# Patient Record
Sex: MALE | Race: BLACK OR AFRICAN AMERICAN | NOT HISPANIC OR LATINO | URBAN - METROPOLITAN AREA
[De-identification: names, ages, dates, MRNs, and addresses within clinical notes are randomized per-mention and may not be internally consistent; named-entity substitution may affect disease eponyms.]

---

## 2017-05-01 ENCOUNTER — EMERGENCY (EMERGENCY)
Facility: HOSPITAL | Age: 22
LOS: 1 days | Discharge: LEFT BEFORE DISCHARGE | End: 2017-05-01
Attending: EMERGENCY MEDICINE
Payer: COMMERCIAL

## 2017-05-01 VITALS
TEMPERATURE: 98 F | HEIGHT: 71 IN | RESPIRATION RATE: 20 BRPM | HEART RATE: 56 BPM | WEIGHT: 177.03 LBS | OXYGEN SATURATION: 98 % | DIASTOLIC BLOOD PRESSURE: 67 MMHG | SYSTOLIC BLOOD PRESSURE: 127 MMHG

## 2017-05-01 VITALS
SYSTOLIC BLOOD PRESSURE: 126 MMHG | RESPIRATION RATE: 20 BRPM | TEMPERATURE: 98 F | HEART RATE: 68 BPM | OXYGEN SATURATION: 99 % | DIASTOLIC BLOOD PRESSURE: 72 MMHG
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
## 2017-05-01 DIAGNOSIS — Z91.013 ALLERGY TO SEAFOOD: ICD-10-CM

## 2017-05-01 DIAGNOSIS — Z91.010 ALLERGY TO PEANUTS: ICD-10-CM
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
## 2017-05-01 DIAGNOSIS — M54.2 CERVICALGIA: ICD-10-CM

## 2017-05-01 DIAGNOSIS — M25.511 PAIN IN RIGHT SHOULDER: ICD-10-CM

## 2017-05-01 PROCEDURE — 99283 EMERGENCY DEPT VISIT LOW MDM: CPT

## 2017-05-01 PROCEDURE — 96372 THER/PROPH/DIAG INJ SC/IM: CPT

## 2017-05-01 PROCEDURE — 99283 EMERGENCY DEPT VISIT LOW MDM: CPT | Mod: 25

## 2017-05-01 RX ORDER — KETOROLAC TROMETHAMINE 30 MG/ML
60 SYRINGE (ML) INJECTION ONCE
Qty: 0 | Refills: 0 | Status: DISCONTINUED | OUTPATIENT
Start: 2017-05-01 | End: 2017-05-01

## 2017-05-01 RX ADMIN — Medication 60 MILLIGRAM(S): at 17:26

## 2017-05-01 NOTE — ED ADULT TRIAGE NOTE - CHIEF COMPLAINT QUOTE
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

## 2017-05-01 NOTE — ED STATDOCS - OBJECTIVE STATEMENT
This is a 22 y/o male presenting c/o right-sided neck pain and stiffness since this morning. Pt was lifting a patient off a bed when he felt a popping and sudden pain. Pain is worsening and exacerbated with right rotation. He took Tylenol w/ mild relief.

## 2017-05-01 NOTE — ED STATDOCS - ATTENDING CONTRIBUTION TO CARE
I, Soniya Valencia, performed the initial face to face bedside interview with this patient regarding history of present illness, review of symptoms and relevant past medical, social and family history.  I completed an independent physical examination.  I was the initial provider who evaluated this patient.  The history, relevant review of systems, past medical and surgical history, medical decision making, and physical examination was documented by the scribe in my presence and I attest to the accuracy of the documentation.  I have signed out the follow up of any pending tests (i.e. labs, radiological studies) to the ACP.  I have communicated the patient’s plan of care and disposition with the ACP.

## 2017-05-01 NOTE — ED STATDOCS - MUSCULOSKELETAL, MLM
Shoulder FROM, no tenderness. No midline Cspine tenderness. Right sternocleidomastoid tight and pain elicited with right rotation of neck.

## 2017-05-01 NOTE — ED STATDOCS - MEDICAL DECISION MAKING DETAILS
Valium, motrin, heating pack, re-evaluate Valium, motrin, heating pack, re-evaluate. Needs a PulseOx reading

## 2017-09-01 ENCOUNTER — EMERGENCY (EMERGENCY)
Facility: HOSPITAL | Age: 22
LOS: 1 days | Discharge: HOME | End: 2017-09-01
Attending: EMERGENCY MEDICINE
Payer: COMMERCIAL

## 2017-09-01 VITALS
TEMPERATURE: 98 F | SYSTOLIC BLOOD PRESSURE: 118 MMHG | WEIGHT: 166.89 LBS | DIASTOLIC BLOOD PRESSURE: 74 MMHG | RESPIRATION RATE: 16 BRPM | HEIGHT: 72 IN | OXYGEN SATURATION: 100 % | HEART RATE: 54 BPM

## 2017-09-01 VITALS
SYSTOLIC BLOOD PRESSURE: 130 MMHG | TEMPERATURE: 97 F | DIASTOLIC BLOOD PRESSURE: 83 MMHG | HEART RATE: 56 BPM | RESPIRATION RATE: 17 BRPM | OXYGEN SATURATION: 100 %

## 2017-09-01 PROCEDURE — 99284 EMERGENCY DEPT VISIT MOD MDM: CPT | Mod: 25

## 2017-09-01 PROCEDURE — 73590 X-RAY EXAM OF LOWER LEG: CPT

## 2017-09-01 PROCEDURE — 73590 X-RAY EXAM OF LOWER LEG: CPT | Mod: 26,RT

## 2017-09-01 PROCEDURE — 73562 X-RAY EXAM OF KNEE 3: CPT

## 2017-09-01 PROCEDURE — 73562 X-RAY EXAM OF KNEE 3: CPT | Mod: 26,RT

## 2017-09-01 NOTE — ED PROVIDER NOTE - PHYSICAL EXAMINATION
Right lower extremity : No edema or contusion noted on inspection. No scars or knee laxity noted. Negative drawer sign. No joint laxity noted .

## 2017-09-01 NOTE — ED PROCEDURE NOTE - CPROC ED POST PROC CARE GUIDE1
Keep the cast/splint/dressing clean and dry./Verbal/written post procedure instructions were given to patient/caregiver./Instructed patient/caregiver to follow-up with primary care physician.

## 2017-09-01 NOTE — ED PROVIDER NOTE - MEDICAL DECISION MAKING DETAILS
21 yr old M presented to ED with right knee pain s/p playing basketball. Examination + tenderness on knee palpation. No weakness present. X-ray negative for fracture. Knee immobilizer placed and crutches provided.

## 2017-09-01 NOTE — ED ADULT NURSE NOTE - OBJECTIVE STATEMENT
Pt received in [facility] c/o R below the knee pain x3days, pt states he hyperextended it while he was playing basketball after coming down from a jump. Pt states it doesn't hurt when it's immobilized but when he walks a distance it is a 7/10 pain. No deformity noted, no erythema, no swelling. Pt able to ambulate and bend knee. Pt with no other complaints. Pt with no medical hx.

## 2017-09-01 NOTE — ED PROVIDER NOTE - OBJECTIVE STATEMENT
21 yr old M presented to ED with right knee pain s/p playing basketball. Pt explained that he was playing basketball today and injured hs right leg. Pt states that when he jumped up he landed awkwardly on his right leg and now he is feeling pain in his right knee and leg. Pt admits to pain with ambulation. Pt denies any other complaints at this time.

## 2018-01-28 ENCOUNTER — EMERGENCY (EMERGENCY)
Facility: HOSPITAL | Age: 23
LOS: 1 days | Discharge: HOME | End: 2018-01-28
Attending: EMERGENCY MEDICINE
Payer: COMMERCIAL

## 2018-01-28 VITALS
WEIGHT: 199.96 LBS | TEMPERATURE: 99 F | RESPIRATION RATE: 16 BRPM | DIASTOLIC BLOOD PRESSURE: 78 MMHG | HEART RATE: 77 BPM | HEIGHT: 68 IN | SYSTOLIC BLOOD PRESSURE: 115 MMHG | OXYGEN SATURATION: 99 %

## 2018-01-28 VITALS
HEIGHT: 72 IN | SYSTOLIC BLOOD PRESSURE: 127 MMHG | DIASTOLIC BLOOD PRESSURE: 72 MMHG | RESPIRATION RATE: 20 BRPM | TEMPERATURE: 98 F | OXYGEN SATURATION: 99 % | WEIGHT: 167.99 LBS | HEART RATE: 60 BPM

## 2018-01-28 PROCEDURE — 99283 EMERGENCY DEPT VISIT LOW MDM: CPT

## 2018-01-28 PROCEDURE — 99283 EMERGENCY DEPT VISIT LOW MDM: CPT | Mod: 25

## 2018-01-28 PROCEDURE — 73590 X-RAY EXAM OF LOWER LEG: CPT

## 2018-01-28 PROCEDURE — 73590 X-RAY EXAM OF LOWER LEG: CPT | Mod: 26,50

## 2018-01-28 RX ORDER — IBUPROFEN 200 MG
600 TABLET ORAL ONCE
Qty: 0 | Refills: 0 | Status: COMPLETED | OUTPATIENT
Start: 2018-01-28 | End: 2018-01-28

## 2018-01-28 RX ADMIN — Medication 600 MILLIGRAM(S): at 20:00

## 2018-01-28 RX ADMIN — Medication 600 MILLIGRAM(S): at 20:02

## 2018-01-28 NOTE — ED ADULT TRIAGE NOTE - NS ED TRIAGE AVPU SCALE
Alert-The patient is alert, awake and responds to voice. The patient is oriented to time, place, and person. The triage nurse is able to obtain subjective information.
Alert-The patient is alert, awake and responds to voice. The patient is oriented to time, place, and person. The triage nurse is able to obtain subjective information.

## 2018-01-28 NOTE — ED PROVIDER NOTE - PHYSICAL EXAMINATION
Knees: no deformities, + FROM, PT able to straight leg raise , NVI distally, + small abrasion inferior to right knee, + tenderness to palpation inferior to bilateral knees

## 2018-01-28 NOTE — ED ADULT TRIAGE NOTE - CHIEF COMPLAINT QUOTE
lt knee pain strained at the gym. machine slipped
restrained , no airbag deployment, a/o x 3, c/o low back pain

## 2018-01-28 NOTE — ED PROVIDER NOTE - ATTENDING CONTRIBUTION TO CARE
I, Kiana Qureshi, performed the initial face to face bedside interview with this patient regarding history of present illness, review of symptoms and relevant past medical, social and family history.  I completed an independent physical examination.  I was the initial provider who evaluated this patient. I have signed out the follow up of any pending tests (i.e. labs, radiological studies) to the ACP.  I have communicated the patient’s plan of care and disposition with the ACP.  22 year old with knee pain and swelling with normal xray to follow up with ortho

## 2020-04-25 ENCOUNTER — MESSAGE (OUTPATIENT)
Age: 25
End: 2020-04-25

## 2021-04-14 NOTE — ED STATDOCS - NS ED ATTENDING STATEMENT MOD
You can access the FollowMyHealth Patient Portal offered by [facility] by registering at the following website: [email or web address]. By joining [employer]’s FollowMyHealth portal, you will also be able to view your health information using other applications (apps) compatible with our system.
I have personally performed a face to face diagnostic evaluation on this patient. I have reviewed the PA note and agree with the history, exam, and plan of care, except as noted.

## 2022-08-23 ENCOUNTER — EMERGENCY (EMERGENCY)
Facility: HOSPITAL | Age: 27
LOS: 1 days | Discharge: HOME | End: 2022-08-23
Attending: EMERGENCY MEDICINE
Payer: COMMERCIAL

## 2022-08-23 VITALS
TEMPERATURE: 98 F | OXYGEN SATURATION: 99 % | RESPIRATION RATE: 16 BRPM | HEART RATE: 59 BPM | HEIGHT: 72 IN | SYSTOLIC BLOOD PRESSURE: 148 MMHG | WEIGHT: 160.06 LBS | DIASTOLIC BLOOD PRESSURE: 92 MMHG

## 2022-08-23 PROCEDURE — 99282 EMERGENCY DEPT VISIT SF MDM: CPT

## 2022-08-23 NOTE — ED PROVIDER NOTE - NS ED ATTENDING STATEMENT MOD
This was a shared visit with the NEL. I reviewed and verified the documentation and independently performed the documented:

## 2022-08-23 NOTE — ED PROVIDER NOTE - PATIENT PORTAL LINK FT
You can access the FollowMyHealth Patient Portal offered by [facility] by registering at the following website: [email or web address]. By joining [employer]’s FollowMyHealth portal, you will also be able to view your health information using other applications (apps) compatible with our system.

## 2022-08-23 NOTE — ED PROVIDER NOTE - OBJECTIVE STATEMENT
Pt is a 26y M with no PMH presenting for R shoulder pain after fall few days ago. Pt states he has been having pain and can range shoulder except abduction. He reports making appt with PMD for today at noon. He denies any head injury/ neck pain/ weakness/ paresthesias. Pt denies any other complaints.

## 2022-08-23 NOTE — ED PROVIDER NOTE - PROGRESS NOTE DETAILS
pt offered pain meds and xray. He states " i'd rather go to my scheduled doctors appt. I have an MRI ordered" Pt declining xray and meds at this time. he would like to leave.

## 2022-08-23 NOTE — ED PROVIDER NOTE - CARE PROVIDER_API CALL
Renan Gutierrez)  Orthopaedic Surgery  [address] [place] [address]  Phone: [phone]  Fax: [phone]  Follow Up Time:

## 2024-02-23 NOTE — ED PROVIDER NOTE - ATTENDING CONTRIBUTION TO CARE
not applicable (Male) 21y old with injury to right knee, evaluate distal ext, xray, pain control, immobilization, and close follow up